# Patient Record
Sex: MALE | Race: WHITE | NOT HISPANIC OR LATINO
[De-identification: names, ages, dates, MRNs, and addresses within clinical notes are randomized per-mention and may not be internally consistent; named-entity substitution may affect disease eponyms.]

---

## 2019-11-15 ENCOUNTER — TRANSCRIPTION ENCOUNTER (OUTPATIENT)
Age: 69
End: 2019-11-15

## 2019-11-15 ENCOUNTER — APPOINTMENT (OUTPATIENT)
Dept: OTHER | Facility: CLINIC | Age: 69
End: 2019-11-15
Payer: COMMERCIAL

## 2019-11-15 VITALS
BODY MASS INDEX: 28.44 KG/M2 | HEART RATE: 88 BPM | WEIGHT: 210 LBS | RESPIRATION RATE: 16 BRPM | DIASTOLIC BLOOD PRESSURE: 85 MMHG | SYSTOLIC BLOOD PRESSURE: 130 MMHG | OXYGEN SATURATION: 98 % | HEIGHT: 72 IN

## 2019-11-15 PROCEDURE — 99396 PREV VISIT EST AGE 40-64: CPT | Mod: 25

## 2019-11-15 PROCEDURE — 94010 BREATHING CAPACITY TEST: CPT

## 2019-11-15 NOTE — DISCUSSION/SUMMARY
[Patient seen for WTC Monitoring ___] : Patient was seen for WTC monitoring [unfilled] [Please See Note in Chart and Documentation in Trial DB] : Please see note in chart and documentation in Trial DB. [FreeTextEntry3] : 70 YO retired [employer] employee\par On 09/11/01 assigned by employer ([employer]) to [address] [place] [address],which is about 6 blocks away from "[facility]".Worked at the site for about 3H,then on 09/12 had scheduled day off.\par On 09/13/01 - 12/2001 assigned to [employer] 1-2 blocks away from "[place]".\par Worked 12H/day x 7days/week for 4wks,then 10H/day x 6 days/week until 12/20/01.\par \par PMH: Diabetes, Cholecystectomy, elevated cholesterol, Glaucoma, \par  elevated PSA and BX of prostate, followed by Urologist \par also has BCC removed R outer eye, had MOHS SX- was not able to obtain pathology \par Smokes small bowl pipe daily for 49 years,\par \par \par ROS , MEDS, PE see trial DB \par Spirometry- Quality D, but NL \par \par CXR  had CT due to back pain 11 06 2019- NL lungs \par PE in trial DB \par A/P periodic WTC MV\par CBC, CMP, LIpids, CXR, UA ordered \par PT will forward me SKIN path report for review and decision re cert as WTC related \par \par REC in 1 year. \par

## 2019-11-15 NOTE — HEALTH RISK ASSESSMENT
[Patient reported colonoscopy was normal] : Patient reported colonoscopy was normal [ColonoscopyDate] : 2014 [ColonoscopyComments] : NL

## 2019-11-18 LAB
ALBUMIN SERPL ELPH-MCNC: 4.5 G/DL
ALP BLD-CCNC: 49 U/L
ALT SERPL-CCNC: 19 U/L
ANION GAP SERPL CALC-SCNC: 14 MMOL/L
APPEARANCE: CLEAR
AST SERPL-CCNC: 18 U/L
BACTERIA: NEGATIVE
BASOPHILS # BLD AUTO: 0.04 K/UL
BASOPHILS NFR BLD AUTO: 0.5 %
BILIRUB SERPL-MCNC: 0.2 MG/DL
BILIRUBIN URINE: NEGATIVE
BLOOD URINE: NEGATIVE
BUN SERPL-MCNC: 18 MG/DL
CALCIUM SERPL-MCNC: 9.3 MG/DL
CHLORIDE SERPL-SCNC: 107 MMOL/L
CHOLEST SERPL-MCNC: 134 MG/DL
CHOLEST/HDLC SERPL: 4.3 RATIO
CO2 SERPL-SCNC: 22 MMOL/L
COLOR: YELLOW
CREAT SERPL-MCNC: 1.17 MG/DL
EOSINOPHIL # BLD AUTO: 0.21 K/UL
EOSINOPHIL NFR BLD AUTO: 2.7 %
GLUCOSE QUALITATIVE U: ABNORMAL
GLUCOSE SERPL-MCNC: 114 MG/DL
HCT VFR BLD CALC: 47.8 %
HDLC SERPL-MCNC: 31 MG/DL
HGB BLD-MCNC: 15.3 G/DL
HYALINE CASTS: 1 /LPF
IMM GRANULOCYTES NFR BLD AUTO: 0.5 %
KETONES URINE: NEGATIVE
LDLC SERPL CALC-MCNC: 54 MG/DL
LEUKOCYTE ESTERASE URINE: NEGATIVE
LYMPHOCYTES # BLD AUTO: 1.45 K/UL
LYMPHOCYTES NFR BLD AUTO: 18.6 %
MAN DIFF?: NORMAL
MCHC RBC-ENTMCNC: 30.6 PG
MCHC RBC-ENTMCNC: 32 GM/DL
MCV RBC AUTO: 95.6 FL
MICROSCOPIC-UA: NORMAL
MONOCYTES # BLD AUTO: 0.54 K/UL
MONOCYTES NFR BLD AUTO: 6.9 %
NEUTROPHILS # BLD AUTO: 5.51 K/UL
NEUTROPHILS NFR BLD AUTO: 70.8 %
NITRITE URINE: NEGATIVE
PH URINE: 6.5
PLATELET # BLD AUTO: 273 K/UL
POTASSIUM SERPL-SCNC: 4.3 MMOL/L
PROT SERPL-MCNC: 6.4 G/DL
PROTEIN URINE: NEGATIVE
RBC # BLD: 5 M/UL
RBC # FLD: 13.7 %
RED BLOOD CELLS URINE: 1 /HPF
SODIUM SERPL-SCNC: 143 MMOL/L
SPECIFIC GRAVITY URINE: 1.03
SQUAMOUS EPITHELIAL CELLS: 1 /HPF
TRIGL SERPL-MCNC: 246 MG/DL
UROBILINOGEN URINE: NORMAL
WBC # FLD AUTO: 7.79 K/UL
WHITE BLOOD CELLS URINE: 0 /HPF

## 2020-06-26 ENCOUNTER — APPOINTMENT (OUTPATIENT)
Dept: UROLOGY | Facility: CLINIC | Age: 70
End: 2020-06-26
Payer: MEDICARE

## 2020-06-26 VITALS
DIASTOLIC BLOOD PRESSURE: 87 MMHG | TEMPERATURE: 98.4 F | RESPIRATION RATE: 16 BRPM | SYSTOLIC BLOOD PRESSURE: 135 MMHG | HEART RATE: 81 BPM

## 2020-06-26 DIAGNOSIS — K75.9 INFLAMMATORY LIVER DISEASE, UNSPECIFIED: ICD-10-CM

## 2020-06-26 DIAGNOSIS — M19.90 UNSPECIFIED OSTEOARTHRITIS, UNSPECIFIED SITE: ICD-10-CM

## 2020-06-26 PROCEDURE — 99213 OFFICE O/P EST LOW 20 MIN: CPT | Mod: 25

## 2020-06-26 PROCEDURE — 81003 URINALYSIS AUTO W/O SCOPE: CPT | Mod: QW

## 2020-06-26 RX ORDER — DAPAGLIFLOZIN 10 MG/1
TABLET, FILM COATED ORAL
Refills: 0 | Status: ACTIVE | COMMUNITY

## 2020-06-26 NOTE — ASSESSMENT
[FreeTextEntry1] : to continue observation\par await prior records to compare psa's and exam\par \par f/u 6 months\par

## 2020-06-26 NOTE — HISTORY OF PRESENT ILLNESS
[FreeTextEntry1] : 68 yo male with h/o high psa-\par last biopsy 2 years ago\par has had multiple biopsies including fusion\par has had asap and pin in the past as well as fusion biopsy previouslly\par Last saw 9 months ago-psa in high 4's\par \par voiding well\par here for free psa and exam\par

## 2020-06-26 NOTE — REVIEW OF SYSTEMS
[Wake up at night to urinate  How many times?  ___] : wakes up to urinate [unfilled] times during the night [Poor quality erections] : Poor quality erections [Joint Pain] : joint pain [Negative] : Heme/Lymph

## 2020-06-26 NOTE — LETTER BODY
[Dear  ___] : Dear  [unfilled], [Courtesy Letter:] : I had the pleasure of seeing your patient, [unfilled], in my office today. [Please see my note below.] : Please see my note below. [Consult Closing:] : Thank you very much for allowing me to participate in the care of this patient.  If you have any questions, please do not hesitate to contact me. [FreeTextEntry1] : doing well \par see note below [FreeTextEntry3] : Fred Stiles MD FACS\par \par Attending Urologist-[facility]\par Director - Strategic Operations Urology\par Assistant Clinical Professor-[facility]\par \par

## 2020-06-26 NOTE — PHYSICAL EXAM
[General Appearance - Well Developed] : well developed [General Appearance - Well Nourished] : well nourished [Normal Appearance] : normal appearance [Well Groomed] : well groomed [General Appearance - In No Acute Distress] : no acute distress [Edema] : no peripheral edema [Respiration, Rhythm And Depth] : normal respiratory rhythm and effort [Exaggerated Use Of Accessory Muscles For Inspiration] : no accessory muscle use [Abdomen Tenderness] : non-tender [Abdomen Soft] : soft [Costovertebral Angle Tenderness] : no ~M costovertebral angle tenderness [Size (2+)] : size was 2+ [Prostate Hard Area Or Nodule On The Left] : had a palpable nodule [Mid] : mid [Nl Inspection] : the anus was normal on inspection. [Nl Sphincter Tone] : normal sphincter tone [Nl Perineum] : perineum was normal on inspection [No Lesions] : no lesions [Circumcised] : the penis was circumcised [Normal] : normal [Testes] : normal [Epididymis] : was normal [Vas Deferens / Spermatic Cord] : was normal [Normal Station and Gait] : the gait and station were normal for the patient's age [] : no rash [No Focal Deficits] : no focal deficits [Oriented To Time, Place, And Person] : oriented to person, place, and time [Affect] : the affect was normal [Mood] : the mood was normal [Not Anxious] : not anxious [No Palpable Adenopathy] : no palpable adenopathy [Tenderness] : no tenderness

## 2020-06-28 LAB
PSA FREE FLD-MCNC: 17 %
PSA FREE SERPL-MCNC: 0.8 NG/ML
PSA SERPL-MCNC: 4.58 NG/ML

## 2020-07-09 ENCOUNTER — RESULT CHARGE (OUTPATIENT)
Age: 70
End: 2020-07-09

## 2020-12-05 ENCOUNTER — TRANSCRIPTION ENCOUNTER (OUTPATIENT)
Age: 70
End: 2020-12-05

## 2021-01-08 ENCOUNTER — APPOINTMENT (OUTPATIENT)
Dept: UROLOGY | Facility: CLINIC | Age: 71
End: 2021-01-08
Payer: MEDICARE

## 2021-01-08 VITALS
HEART RATE: 81 BPM | RESPIRATION RATE: 16 BRPM | TEMPERATURE: 97.5 F | SYSTOLIC BLOOD PRESSURE: 150 MMHG | DIASTOLIC BLOOD PRESSURE: 79 MMHG

## 2021-01-08 PROCEDURE — 99212 OFFICE O/P EST SF 10 MIN: CPT | Mod: 25

## 2021-01-08 PROCEDURE — 81003 URINALYSIS AUTO W/O SCOPE: CPT | Mod: QW

## 2021-01-08 NOTE — HISTORY OF PRESENT ILLNESS
[FreeTextEntry1] : 70 year old male with ASAP,  PIN\par Patient has palpable nodule on the left, has had multiple fusion biopsies in the past- most recent 2 years ago\par with asap, pin\par \par PSA 6/28: 4.58\par PSA has been in high 4s\par \par also nocturia 1x- not bothersome \par

## 2021-01-08 NOTE — END OF VISIT
[FreeTextEntry3] : Medical record entries made by the scribe today, were at my direction and personally dictated to them by me, Dr. Fred Stiles on 01/08/2021. I have reviewed the chart and agree that the record accurately reflects my personal performance of the history, physical exam, assessment, and plan.

## 2021-01-08 NOTE — ASSESSMENT
[FreeTextEntry1] : 70 year old male with ASAP, PIN\par Free PSA sent today\par Glucose seen in urine, patient was notified-due to Farx[facility]\par \par Follow up 6 months psa ipss

## 2021-01-08 NOTE — PHYSICAL EXAM
[General Appearance - Well Developed] : well developed [General Appearance - Well Nourished] : well nourished [Normal Appearance] : normal appearance [Well Groomed] : well groomed [General Appearance - In No Acute Distress] : no acute distress [Abdomen Soft] : soft [Abdomen Tenderness] : non-tender [Costovertebral Angle Tenderness] : no ~M costovertebral angle tenderness [Urethral Meatus] : meatus normal [Penis Abnormality] : normal circumcised penis [Scrotum] : the scrotum was normal [Rectal Exam - Seminal Vesicles] : the seminal vesicles were normal [Epididymis] : the epididymides were normal [Testes Tenderness] : no tenderness of the testes [Testes Mass (___cm)] : there were no testicular masses [Anus Abnormality] : the anus and perineum were normal [Rectal Exam - Rectum] : digital rectal exam was normal [Prostate Enlargement] : the prostate was not enlarged [Prostate Tenderness] : the prostate was not tender [Prostate Size ___ (0-4)] : prostate size [unfilled] (scale: 0-4) [FreeTextEntry1] : palpable nodule on the left - unchanged for years-multiple biopsies

## 2021-01-11 ENCOUNTER — TRANSCRIPTION ENCOUNTER (OUTPATIENT)
Age: 71
End: 2021-01-11

## 2021-01-11 LAB
PSA FREE FLD-MCNC: 19 %
PSA FREE SERPL-MCNC: 0.92 NG/ML
PSA SERPL-MCNC: 4.92 NG/ML

## 2021-02-17 ENCOUNTER — FORM ENCOUNTER (OUTPATIENT)
Age: 71
End: 2021-02-17

## 2021-02-18 ENCOUNTER — APPOINTMENT (OUTPATIENT)
Dept: OTHER | Facility: CLINIC | Age: 71
End: 2021-02-18
Payer: COMMERCIAL

## 2021-02-18 DIAGNOSIS — Z04.9 ENCOUNTER FOR EXAMINATION AND OBSERVATION FOR UNSPECIFIED REASON: ICD-10-CM

## 2021-02-18 PROCEDURE — 99397 PER PM REEVAL EST PAT 65+ YR: CPT | Mod: 95

## 2021-02-18 NOTE — DISCUSSION/SUMMARY
[Patient seen for WTC Monitoring ___] : Patient was seen for WTC monitoring [unfilled] [Please See Note in Chart and Documentation in Trial DB] : Please see note in chart and documentation in Trial DB. [Home] : at home, [unfilled] , at the time of the visit. [Other Location: e.g. Home (Enter Location, City,State)___] : at [unfilled] [Verbal consent obtained from patient] : the patient, [unfilled] [FreeTextEntry3] : 71 YO retired [employer] employee\par On 09/11/01 assigned by employer ([employer]) to [address] [place] [address],which is about 6 blocks away from "[place]".Worked at the site for about 3H,then on 09/12 had scheduled day off.\par On 09/13/01 - 12/2001 assigned to [employer] 1-2 blocks away from [place]\par Worked 12H/day x 7days/week for 4wks,then 10H/day x 6 days/week until 12/20/01.\par \par PMH: Diabetes, Cholecystectomy, elevated cholesterol, Glaucoma, \par  elevated PSA and BX of prostate, followed by Urologist \par also has BCC r cantus removed R outer eye, had MOHS SX- \par s/ MOHS 2016\par Smokes small bowl pipe daily for 49 years,\par \par \par \par \par CXR  had CT due to back pain 11 06 2019- NL lungs \par \par A/P periodic WTC MV\par \par PT will forward me SKIN path report for review \par  stated that had nodular BCC 2016 wtc 3 will be filled out \par had flu vaccine already\par  pneumonia vaccine as well\par  scheduled for COVID injection \par \par REC in 1 year. \par

## 2021-03-11 PROBLEM — K75.9 HEPATITIS: Status: ACTIVE | Noted: 2020-06-26

## 2021-03-30 ENCOUNTER — FORM ENCOUNTER (OUTPATIENT)
Age: 71
End: 2021-03-30

## 2021-04-09 ENCOUNTER — APPOINTMENT (OUTPATIENT)
Dept: OTHER | Facility: CLINIC | Age: 71
End: 2021-04-09
Payer: COMMERCIAL

## 2021-04-09 DIAGNOSIS — C44.91 BASAL CELL CARCINOMA OF SKIN, UNSPECIFIED: ICD-10-CM

## 2021-04-09 PROCEDURE — 99441: CPT | Mod: 95

## 2021-04-09 NOTE — REASON FOR VISIT
[Follow-Up] : a follow-up visit [FreeTextEntry1] : BCC SKIN CANCER Certified BY [facility] AS WTC RELATED

## 2021-04-09 NOTE — HISTORY OF PRESENT ILLNESS
[FreeTextEntry1] : 69 YO retired [employer] employee\par On 09/11/01 assigned by employer ([employer]) to [address] [place] [address],which is about 6 blocks away from [place]Worked at the site for about 3H,then on 09/12 had scheduled day off.\par On 09/13/01 - 12/2001 assigned to [employer] 1-2 blocks away from [place]\par Worked 12H/day x 7days/week for 4wks,then 10H/day x 6 days/week until 12/20/01.\par \par PMH: Diabetes, Cholecystectomy, elevated cholesterol, Glaucoma, \par  elevated PSA and BX of prostate, followed by Urologist \par \par also has BCC r cantus removed R outer eye, had MOHS SX- \par s/P  MOHS 2016\par HE IS FOLLOWED BY DERMATOLOGIST AND GETS REGULAR SKIN CHECKS \par

## 2021-04-09 NOTE — ASSESSMENT
[FreeTextEntry1] : Patient WITH HX OF SKIN CANCER CERTIFIED BY [facility] AS WTC RELATED \par  HE PLANS TO Continue TO FOLLOW UP With HIS DERMATOLOGIST FOR REGULAR SKIN CHECKS \par  I EXPLAINED TO HIM ABOUT [facility] CLAIM Filing PROCESS, HE SAID HE WAS NOT INTERESTED AT THIS TIME\par \par

## 2021-07-09 ENCOUNTER — APPOINTMENT (OUTPATIENT)
Dept: UROLOGY | Facility: CLINIC | Age: 71
End: 2021-07-09
Payer: MEDICARE

## 2021-07-09 ENCOUNTER — RESULT CHARGE (OUTPATIENT)
Age: 71
End: 2021-07-09

## 2021-07-09 VITALS
SYSTOLIC BLOOD PRESSURE: 146 MMHG | DIASTOLIC BLOOD PRESSURE: 88 MMHG | TEMPERATURE: 98.2 F | HEART RATE: 80 BPM | OXYGEN SATURATION: 96 %

## 2021-07-09 PROCEDURE — 99213 OFFICE O/P EST LOW 20 MIN: CPT

## 2021-07-10 RX ORDER — BLOOD SUGAR DIAGNOSTIC
STRIP MISCELLANEOUS
Qty: 100 | Refills: 0 | Status: DISCONTINUED | COMMUNITY
Start: 2021-01-12

## 2021-07-10 RX ORDER — BIMATOPROST 0.1 MG/ML
0.01 SOLUTION/ DROPS OPHTHALMIC
Qty: 5 | Refills: 0 | Status: DISCONTINUED | COMMUNITY
Start: 2020-12-24

## 2021-07-10 NOTE — HISTORY OF PRESENT ILLNESS
[FreeTextEntry1] : 70 year old male following up for ASAP, PIN -dx on biopsy 2015\par \par 2015 pin/asap-repeat 2015 asap-MDX 27% prob malig-8% high grade-mRI pirad 4\par \par 2017 asap-mdx 31 % prob with 11% high grade--MRI pirad 2--psa 7.6\par \par pt has palpable nodule on the left \par \par notes nocturia x 1 - not bothersome and is doing well\par \par not currently on any  meds \par \par IPSS 06/26/2020: 1 \par \par PSA 6/28/2020: 4.58 ng/mL \par PSA 01/07/2021: 4.92 ng/mL \par \par Has had multiple biopsy-

## 2021-07-10 NOTE — END OF VISIT
[FreeTextEntry3] : Medical record entries made by the scribe today, were at my direction and personally dictated to them by me, Dr. Fred Stiles on 07/09/2021. I have reviewed the chart and agree that the record accurately reflects my personal performance of the history, physical exam, assessment, and plan.

## 2021-07-10 NOTE — PHYSICAL EXAM
[General Appearance - Well Developed] : well developed [General Appearance - Well Nourished] : well nourished [Normal Appearance] : normal appearance [Well Groomed] : well groomed [General Appearance - In No Acute Distress] : no acute distress [Abdomen Soft] : soft [Abdomen Tenderness] : non-tender [Costovertebral Angle Tenderness] : no ~M costovertebral angle tenderness [Urethral Meatus] : meatus normal [Scrotum] : the scrotum was normal [Epididymis] : the epididymides were normal [Testes Tenderness] : no tenderness of the testes [Testes Mass (___cm)] : there were no testicular masses [Anus Abnormality] : the anus and perineum were normal [Rectal Exam - Rectum] : digital rectal exam was normal [Prostate Enlargement] : the prostate was not enlarged [Prostate Tenderness] : the prostate was not tender [Penis Abnormality] : normal circumcised penis [Prostate Size ___ (0-4)] : prostate size [unfilled] (scale: 0-4) [FreeTextEntry1] : nodule left side

## 2021-07-10 NOTE — ASSESSMENT
[FreeTextEntry1] : 70 year old male with ASAP, PIN \par pt has palpable nodule on the left -s/p multiple biopsy and mri\par \par free PSA sent today\par follow up in 6 months forfree  PSA and IPSS

## 2021-07-23 LAB
PSA FREE FLD-MCNC: 15 %
PSA FREE SERPL-MCNC: 0.73 NG/ML
PSA SERPL-MCNC: 4.75 NG/ML

## 2022-01-07 ENCOUNTER — RESULT CHARGE (OUTPATIENT)
Age: 72
End: 2022-01-07

## 2022-01-07 ENCOUNTER — APPOINTMENT (OUTPATIENT)
Dept: UROLOGY | Facility: CLINIC | Age: 72
End: 2022-01-07
Payer: MEDICARE

## 2022-01-07 VITALS
HEART RATE: 89 BPM | HEIGHT: 72 IN | SYSTOLIC BLOOD PRESSURE: 142 MMHG | BODY MASS INDEX: 29.8 KG/M2 | TEMPERATURE: 98 F | OXYGEN SATURATION: 94 % | DIASTOLIC BLOOD PRESSURE: 77 MMHG | WEIGHT: 220 LBS

## 2022-01-07 PROCEDURE — 99213 OFFICE O/P EST LOW 20 MIN: CPT

## 2022-01-07 PROCEDURE — 81003 URINALYSIS AUTO W/O SCOPE: CPT | Mod: QW

## 2022-01-13 NOTE — ASSESSMENT
[FreeTextEntry1] : 71 year old male with ASAP, PIN\par \par  prostate nodule feels slightly larger and harder\par Free PSA sent today\par has had multiple biopsies \par \par to go for MR prostate\par \par Follow up in 6 months pending above

## 2022-01-13 NOTE — PHYSICAL EXAM
[General Appearance - Well Developed] : well developed [General Appearance - Well Nourished] : well nourished [Normal Appearance] : normal appearance [Well Groomed] : well groomed [General Appearance - In No Acute Distress] : no acute distress [Abdomen Soft] : soft [Abdomen Tenderness] : non-tender [Costovertebral Angle Tenderness] : no ~M costovertebral angle tenderness [Urethral Meatus] : meatus normal [Penis Abnormality] : normal circumcised penis [Scrotum] : the scrotum was normal [Rectal Exam - Seminal Vesicles] : the seminal vesicles were normal [Epididymis] : the epididymides were normal [Testes Tenderness] : no tenderness of the testes [Testes Mass (___cm)] : there were no testicular masses [Anus Abnormality] : the anus and perineum were normal [Rectal Exam - Rectum] : digital rectal exam was normal [Prostate Enlargement] : the prostate was not enlarged [Prostate Tenderness] : the prostate was not tender [Prostate Size ___ (0-4)] : prostate size [unfilled] (scale: 0-4) [FreeTextEntry1] : significant nodule on the left- feels slightly larger

## 2022-01-13 NOTE — HISTORY OF PRESENT ILLNESS
[FreeTextEntry1] : 71 year old male hx of ASAP, PIN here today for follow up\par \par 2015 pin/ASAP-repeat 2015 asap-MDX 27% prob malig-8% high grade-mRI pirad 4\par \par 2017 ASAP-mdx 31 % prob with 11% high grade--MRI pirad 2--psa 7.6\par \par Patient has palpable nodule on the left\par \par PSA 7/23/21: 4.75, 0.73, 15% free\par PSA 1/07/2021: 4.92  \par PSA 6/28/2020: 4.58 \par \par multiple biopsies in the past\par \par doing well with no complaints \par IPSS: 2

## 2022-01-13 NOTE — END OF VISIT
[FreeTextEntry3] : Medical record entries made by the scribe today, were at my direction and personally dictated to them by me, Dr. Fred Stiles on 01/07/2022. I have reviewed the chart and agree that the record accurately reflects my personal performance of the history, physical exam, assessment, and plan.

## 2022-01-14 ENCOUNTER — NON-APPOINTMENT (OUTPATIENT)
Age: 72
End: 2022-01-14

## 2022-01-14 LAB
PSA FREE FLD-MCNC: 23 %
PSA FREE SERPL-MCNC: 1.7 NG/ML
PSA SERPL-MCNC: 7.48 NG/ML

## 2022-01-16 ENCOUNTER — OUTPATIENT (OUTPATIENT)
Dept: OUTPATIENT SERVICES | Facility: HOSPITAL | Age: 72
LOS: 1 days | End: 2022-01-16
Payer: MEDICARE

## 2022-01-16 ENCOUNTER — RESULT REVIEW (OUTPATIENT)
Age: 72
End: 2022-01-16

## 2022-01-16 ENCOUNTER — APPOINTMENT (OUTPATIENT)
Dept: MRI IMAGING | Facility: IMAGING CENTER | Age: 72
End: 2022-01-16
Payer: MEDICARE

## 2022-01-16 DIAGNOSIS — N40.2 NODULAR PROSTATE WITHOUT LOWER URINARY TRACT SYMPTOMS: ICD-10-CM

## 2022-01-16 DIAGNOSIS — N42.32 ATYPICAL SMALL ACINAR PROLIFERATION OF PROSTATE: ICD-10-CM

## 2022-01-16 PROCEDURE — 72197 MRI PELVIS W/O & W/DYE: CPT | Mod: 26,ME

## 2022-01-16 PROCEDURE — G1004: CPT

## 2022-01-16 PROCEDURE — 76498P: CUSTOM | Mod: 26,MH

## 2022-01-16 PROCEDURE — A9585: CPT

## 2022-01-16 PROCEDURE — 76498 UNLISTED MR PROCEDURE: CPT

## 2022-01-16 PROCEDURE — 72197 MRI PELVIS W/O & W/DYE: CPT | Mod: ME

## 2022-05-09 ENCOUNTER — NON-APPOINTMENT (OUTPATIENT)
Age: 72
End: 2022-05-09

## 2022-07-08 ENCOUNTER — APPOINTMENT (OUTPATIENT)
Dept: UROLOGY | Facility: CLINIC | Age: 72
End: 2022-07-08

## 2022-07-08 VITALS
HEART RATE: 66 BPM | DIASTOLIC BLOOD PRESSURE: 69 MMHG | TEMPERATURE: 97.6 F | BODY MASS INDEX: 29.26 KG/M2 | WEIGHT: 216 LBS | HEIGHT: 72 IN | SYSTOLIC BLOOD PRESSURE: 125 MMHG | OXYGEN SATURATION: 96 %

## 2022-07-08 PROCEDURE — 81003 URINALYSIS AUTO W/O SCOPE: CPT | Mod: QW

## 2022-07-08 PROCEDURE — 99213 OFFICE O/P EST LOW 20 MIN: CPT

## 2022-07-08 RX ORDER — TRAVOPROST (BENZALKONIUM) 0.004 %
0 DROPS OPHTHALMIC (EYE)
Refills: 0 | Status: DISCONTINUED | COMMUNITY
End: 2022-07-08

## 2022-07-09 RX ORDER — LOSARTAN POTASSIUM 100 MG/1
100 TABLET, FILM COATED ORAL
Qty: 90 | Refills: 0 | Status: ACTIVE | COMMUNITY
Start: 2022-05-25

## 2022-07-09 RX ORDER — LATANOPROST/PF 0.005 %
0.01 DROPS OPHTHALMIC (EYE)
Qty: 2 | Refills: 0 | Status: ACTIVE | COMMUNITY
Start: 2022-06-22

## 2022-07-09 RX ORDER — BIMATOPROST 0.1 MG/ML
0.01 SOLUTION/ DROPS OPHTHALMIC
Refills: 0 | Status: ACTIVE | COMMUNITY

## 2022-07-09 RX ORDER — FENOFIBRATE 134 MG/1
134 CAPSULE ORAL
Qty: 90 | Refills: 0 | Status: ACTIVE | COMMUNITY
Start: 2022-05-25

## 2022-07-09 RX ORDER — METFORMIN ER 500 MG 500 MG/1
500 TABLET ORAL
Qty: 360 | Refills: 0 | Status: ACTIVE | COMMUNITY
Start: 2022-04-23

## 2022-07-09 NOTE — END OF VISIT
[FreeTextEntry3] : Medical record entries made by the scribe today, were at my direction and personally dictated to them by me, Dr. Fred Stiles on 07/08/2022. I have reviewed the chart and agree that the record accurately reflects my personal performance of the history, physical exam, assessment, and plan.

## 2022-07-09 NOTE — HISTORY OF PRESENT ILLNESS
[FreeTextEntry1] : 71 year old male with h/o ASAP, PIN, left prostate nodule here today for follow up \par \par MR prostate 1/16/22: PIRADS 2, volume-70 cc, PSAD: 0.07\par \par 2015 pin/ASAP-repeat 2015 asap-MDX 27% prob malig-8% high grade-mRI pirad 4\par \par 2017 ASAP-mdx 31 % prob with 11% high grade--MRI pirad 2--psa 7.6\par \par Patient has palpable nodule on the left\par \par PSA total and free 1/6/2022: 7.48, 1.70, free 23% \par PSA 7/23/21: 4.75, 0.73, 15% free\par PSA 1/07/2021: 4.92 \par PSA 6/28/2020: 4.58 \par \par multiple biopsies in the past\par \par no issues with urination \par IPSS today: 3

## 2022-07-09 NOTE — PHYSICAL EXAM
[General Appearance - Well Developed] : well developed [General Appearance - Well Nourished] : well nourished [Normal Appearance] : normal appearance [Well Groomed] : well groomed [General Appearance - In No Acute Distress] : no acute distress [Abdomen Soft] : soft [Abdomen Tenderness] : non-tender [Costovertebral Angle Tenderness] : no ~M costovertebral angle tenderness [Urethral Meatus] : meatus normal [Penis Abnormality] : normal circumcised penis [Scrotum] : the scrotum was normal [Urinary Bladder Findings] : the bladder was normal on palpation [Epididymis] : the epididymides were normal [Testes Tenderness] : no tenderness of the testes [Testes Mass (___cm)] : there were no testicular masses [Anus Abnormality] : the anus and perineum were normal [Rectal Exam - Rectum] : digital rectal exam was normal [Prostate Size ___ (0-4)] : prostate size [unfilled] (scale: 0-4) [FreeTextEntry1] : left nodulewithoutchange

## 2022-07-09 NOTE — ASSESSMENT
[FreeTextEntry1] : 71 year old male with h/o ASAP, PIN, prostate nodule \par \par PSA total and free sent today \par \par await PSA\par \par ?rebiopsy \par \par f/u 6 months pending PSA

## 2022-07-24 LAB
PSA FREE FLD-MCNC: 17 %
PSA FREE SERPL-MCNC: 0.87 NG/ML
PSA SERPL-MCNC: 5.11 NG/ML

## 2023-01-17 ENCOUNTER — APPOINTMENT (OUTPATIENT)
Dept: UROLOGY | Facility: CLINIC | Age: 73
End: 2023-01-17
Payer: MEDICARE

## 2023-01-17 VITALS
DIASTOLIC BLOOD PRESSURE: 83 MMHG | TEMPERATURE: 98.1 F | HEART RATE: 78 BPM | BODY MASS INDEX: 29.16 KG/M2 | SYSTOLIC BLOOD PRESSURE: 151 MMHG | WEIGHT: 215 LBS | OXYGEN SATURATION: 97 %

## 2023-01-17 DIAGNOSIS — N42.31 PROSTATIC INTRAEPITHELIAL NEOPLASIA: ICD-10-CM

## 2023-01-17 PROCEDURE — 99213 OFFICE O/P EST LOW 20 MIN: CPT

## 2023-01-18 PROBLEM — N42.31: Status: ACTIVE | Noted: 2020-06-26

## 2023-01-18 LAB
PSA FREE FLD-MCNC: 20 %
PSA FREE SERPL-MCNC: 0.91 NG/ML
PSA SERPL-MCNC: 4.49 NG/ML

## 2023-01-18 RX ORDER — AMOXICILLIN AND CLAVULANATE POTASSIUM 875; 125 MG/1; MG/1
875-125 TABLET, COATED ORAL
Qty: 20 | Refills: 0 | Status: COMPLETED | COMMUNITY
Start: 2022-11-09

## 2023-01-18 RX ORDER — FLUTICASONE PROPIONATE 50 UG/1
50 SPRAY, METERED NASAL
Qty: 16 | Refills: 0 | Status: COMPLETED | COMMUNITY
Start: 2022-11-09

## 2023-01-18 NOTE — END OF VISIT
[FreeTextEntry3] : Medical record entries made by the scribe today, were at my direction and personally dictated to them by me, Dr. Fred Stiles on 01/17/2023. I have reviewed the chart and agree that the record accurately reflects my personal performance of the history, physical exam, assessment, and plan.\par

## 2023-01-18 NOTE — ASSESSMENT
[FreeTextEntry1] : 71 year old male with h/o ASAP, PIN, with persoistent  prostate nodule \par \par exosome and PSA total and free sent today \par \par pending exosome and PSA,\par \par \par rebiopsy or f/u in 6 months

## 2023-01-18 NOTE — ADDENDUM
[FreeTextEntry1] : I, Reginald Watson, solely acted as scribe for Dr. Fred Stiles on 01/17/2023.\par

## 2023-01-18 NOTE — HISTORY OF PRESENT ILLNESS
[FreeTextEntry1] : 71 year old male with h/o ASAP, PIN, left prostate nodule here today for follow up \par \par MR prostate 1/16/22: PIRADS 2, volume-70 cc, PSAD: 0.07\par \par 2015 pin/ASAP-\par \par repeat bx 2015 asap- at that time MDX 27% -mRI pirad 4\par \par \par 2017  biopsy ASAP- at that time mdx 31 % prob with 11% high grade--MRI pirad 2--psa 7.6\par \par Patient has unchanged palpable nodule on the left\par \par PSA total and free 1/6/2022: 7.48, 1.70, free 23% \par PSA 7/23/21: 4.75, 0.73, 15% free\par PSA 1/07/2021: 4.92 \par PSA 6/28/2020: 4.58 \par \par multiple biopsies in the past\par \par no issues with urination \par IPSS today: 3

## 2023-01-18 NOTE — PHYSICAL EXAM
[General Appearance - Well Developed] : well developed [General Appearance - Well Nourished] : well nourished [Normal Appearance] : normal appearance [Well Groomed] : well groomed [General Appearance - In No Acute Distress] : no acute distress [Abdomen Soft] : soft [Abdomen Tenderness] : non-tender [Costovertebral Angle Tenderness] : no ~M costovertebral angle tenderness [Urethral Meatus] : meatus normal [Scrotum] : the scrotum was normal [Epididymis] : the epididymides were normal [Testes Tenderness] : no tenderness of the testes [Testes Mass (___cm)] : there were no testicular masses [Anus Abnormality] : the anus and perineum were normal [Rectal Exam - Rectum] : digital rectal exam was normal [Prostate Tenderness] : the prostate was not tender [Prostate Size ___ (0-4)] : prostate size [unfilled] (scale: 0-4) [FreeTextEntry1] : nodule on left without change

## 2023-01-19 ENCOUNTER — NON-APPOINTMENT (OUTPATIENT)
Age: 73
End: 2023-01-19

## 2023-06-23 ENCOUNTER — APPOINTMENT (OUTPATIENT)
Dept: UROLOGY | Facility: CLINIC | Age: 73
End: 2023-06-23
Payer: MEDICARE

## 2023-06-23 VITALS
OXYGEN SATURATION: 96 % | SYSTOLIC BLOOD PRESSURE: 156 MMHG | DIASTOLIC BLOOD PRESSURE: 75 MMHG | TEMPERATURE: 97.8 F | HEART RATE: 83 BPM

## 2023-06-23 DIAGNOSIS — N42.32 ATYPICAL SMALL ACINAR PROLIFERATION OF PROSTATE: ICD-10-CM

## 2023-06-23 PROCEDURE — 99214 OFFICE O/P EST MOD 30 MIN: CPT

## 2023-06-23 RX ORDER — LOSARTAN POTASSIUM 50 MG/1
50 TABLET, FILM COATED ORAL
Refills: 0 | Status: DISCONTINUED | COMMUNITY
End: 2023-06-23

## 2023-06-23 NOTE — ASSESSMENT
[FreeTextEntry1] : 71 y/o male w h/o ASAP, PIN, and L prostate nodule\par \par PSA w free sent today\par \par d/w pt +/- predictive value of exosome test\par \par patient has stable palpable nodule on the left\par \par has no voiding complaints\par \par f/u w Dr Kaur in 6 months for PSA

## 2023-06-23 NOTE — PHYSICAL EXAM
[General Appearance - Well Developed] : well developed [General Appearance - Well Nourished] : well nourished [Normal Appearance] : normal appearance [Well Groomed] : well groomed [General Appearance - In No Acute Distress] : no acute distress [Abdomen Soft] : soft [Abdomen Tenderness] : non-tender [Costovertebral Angle Tenderness] : no ~M costovertebral angle tenderness [Urethral Meatus] : meatus normal [Penis Abnormality] : normal circumcised penis [Scrotum] : the scrotum was normal [Epididymis] : the epididymides were normal [Testes Tenderness] : no tenderness of the testes [Testes Mass (___cm)] : there were no testicular masses [Anus Abnormality] : the anus and perineum were normal [Rectal Exam - Rectum] : digital rectal exam was normal [Prostate Tenderness] : the prostate was not tender [Prostate Size ___ (0-4)] : prostate size [unfilled] (scale: 0-4) [FreeTextEntry1] : L stable palpable prostate nodule, patient was offered presence of chaperone for exam and declined

## 2023-06-23 NOTE — HISTORY OF PRESENT ILLNESS
[FreeTextEntry1] : 71 y/o male w h/o ASAP, PIN, L prostate nodule here today for follow up\par \par reviewed exosome test w pt 1/17/2023,\par score 28.77\par \par MRI prostate 1/16/2022,\par PSA density 0.07 ng/mL/mL\par prostate volume 70 cc\par PIRADS 2\par \par 2015 pin/ASAP-\par \par repeat bx 2015 asap- at that time MDX 27% -MRI pirad 4\par \par 2017 biopsy ASAP- at that time mdx 31 % prob with 11% high grade--MRI pirad 2--psa 7.6\par \par PSA total and free: 1/16/2023, 4.49, 0.91, 20% free\par 7/8/2022, 5.11, 0.87, 17% free\par 1/6/2022, 7.48, 1.70, 23% free\par 7/23/2021, 4.75, 0.73, 15% free\par 1/07/2021, 4.92, 0.92, 19% free\par 6/28/2020, 4.58, 0.80, 17% free\par \par multiple biopsies in past\par \par patient has had stable palpable nodule on the left\par \par has no urinary complaints,\par denies hematuria and dysuria\par \par IPSS today: 4\par 1/17/2023, 3\par 7/8/2022, 3\par 1/7/2022, 2\par 6/26/2020, 1 [facility]

## 2023-06-23 NOTE — END OF VISIT
[FreeTextEntry3] : All medical record entries made by the scribe today, were at my direction and personally dictated to them by me, Dr. Fred Stiles on 06/23/2023. I have reviewed the chart and agree that the record accurately reflects my personal performance of the history, physical exam, assessment, and plan. I have also personally directed, reviewed, and agreed with the chart.

## 2023-06-30 ENCOUNTER — NON-APPOINTMENT (OUTPATIENT)
Age: 73
End: 2023-06-30

## 2023-06-30 LAB
PSA FREE FLD-MCNC: 21 %
PSA FREE SERPL-MCNC: 1.05 NG/ML
PSA SERPL-MCNC: 4.96 NG/ML

## 2023-08-03 ENCOUNTER — NON-APPOINTMENT (OUTPATIENT)
Age: 73
End: 2023-08-03

## 2023-12-22 ENCOUNTER — APPOINTMENT (OUTPATIENT)
Dept: UROLOGY | Facility: CLINIC | Age: 73
End: 2023-12-22

## 2023-12-29 ENCOUNTER — NON-APPOINTMENT (OUTPATIENT)
Age: 73
End: 2023-12-29

## 2024-04-11 DIAGNOSIS — Z00.00 ENCOUNTER FOR GENERAL ADULT MEDICAL EXAMINATION W/OUT ABNORMAL FINDINGS: ICD-10-CM

## 2024-04-11 DIAGNOSIS — R97.20 ELEVATED PROSTATE, SPECIFIC ANTIGEN [PSA]: ICD-10-CM

## 2024-04-12 ENCOUNTER — APPOINTMENT (OUTPATIENT)
Dept: UROLOGY | Facility: CLINIC | Age: 74
End: 2024-04-12
Payer: MEDICARE

## 2024-04-12 VITALS
RESPIRATION RATE: 16 BRPM | BODY MASS INDEX: 29.12 KG/M2 | HEART RATE: 70 BPM | TEMPERATURE: 97.8 F | DIASTOLIC BLOOD PRESSURE: 71 MMHG | WEIGHT: 215 LBS | SYSTOLIC BLOOD PRESSURE: 137 MMHG | OXYGEN SATURATION: 98 % | HEIGHT: 72 IN

## 2024-04-12 DIAGNOSIS — N40.2 NODULAR PROSTATE W/OUT LOWER URINARY TRACT SYMPTOMS: ICD-10-CM

## 2024-04-12 PROCEDURE — 99203 OFFICE O/P NEW LOW 30 MIN: CPT

## 2024-04-12 NOTE — ASSESSMENT
[FreeTextEntry1] : 74 y/o male here today for check up Denies eating spicy, chocolate, citrus Numerous neg prostate Bx stated by patient   Denies FHx PCA PSA: 4.96 (06/30/2023)  MR Prostate (05/08/2022) INTERPRETATION:   No MRI targetable prostate lesions. 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 *PIRADS 2 - Low (clinically significant cancer is unlikely to be present) Moderate nodular hyperplasia of the transition zone for an overall gland volume of 70 cc. PSA density: 0.07 ng/mL/cc  Ordering MR Prostate  PSA/Renal drawn today A voided urine specimen was obtained and sent for ua/ucx  Will F/U in case of positive results

## 2024-04-12 NOTE — HISTORY OF PRESENT ILLNESS
[FreeTextEntry1] : 72 y/o male here today for check up Denies eating spicy, chocolate, citrus Numerous neg prostate Bx stated by patient   Denies FHx PCA PSA: 4.96 (06/30/2023)  MR Prostate (05/08/2022) INTERPRETATION:   No MRI targetable prostate lesions. 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 *PIRADS 2 - Low (clinically significant cancer is unlikely to be present) Moderate nodular hyperplasia of the transition zone for an overall gland volume of 70 cc. PSA density: 0.07 ng/mL/cc

## 2024-04-13 LAB
ALBUMIN SERPL ELPH-MCNC: 4.4 G/DL
ANION GAP SERPL CALC-SCNC: 16 MMOL/L
APPEARANCE: CLEAR
BACTERIA: NEGATIVE /HPF
BILIRUBIN URINE: NEGATIVE
BLOOD URINE: NEGATIVE
BUN SERPL-MCNC: 21 MG/DL
CALCIUM SERPL-MCNC: 9.5 MG/DL
CAST: 1 /LPF
CHLORIDE SERPL-SCNC: 107 MMOL/L
CO2 SERPL-SCNC: 20 MMOL/L
COLOR: YELLOW
CREAT SERPL-MCNC: 1.21 MG/DL
EGFR: 62 ML/MIN/1.73M2
EPITHELIAL CELLS: 0 /HPF
GLUCOSE QUALITATIVE U: >=1000 MG/DL
GLUCOSE SERPL-MCNC: 94 MG/DL
KETONES URINE: NEGATIVE MG/DL
LEUKOCYTE ESTERASE URINE: NEGATIVE
MICROSCOPIC-UA: NORMAL
NITRITE URINE: NEGATIVE
PH URINE: 5.5
PHOSPHATE SERPL-MCNC: 3.6 MG/DL
POTASSIUM SERPL-SCNC: 4.7 MMOL/L
PROTEIN URINE: NEGATIVE MG/DL
PSA SERPL-MCNC: 4.56 NG/ML
RED BLOOD CELLS URINE: 1 /HPF
SODIUM SERPL-SCNC: 143 MMOL/L
SPECIFIC GRAVITY URINE: >1.03
UROBILINOGEN URINE: 0.2 MG/DL
WHITE BLOOD CELLS URINE: 0 /HPF

## 2024-04-14 LAB — BACTERIA UR CULT: NORMAL

## 2024-04-25 ENCOUNTER — APPOINTMENT (OUTPATIENT)
Dept: MRI IMAGING | Facility: IMAGING CENTER | Age: 74
End: 2024-04-25
Payer: MEDICARE

## 2024-04-25 ENCOUNTER — RESULT REVIEW (OUTPATIENT)
Age: 74
End: 2024-04-25

## 2024-04-25 ENCOUNTER — OUTPATIENT (OUTPATIENT)
Dept: OUTPATIENT SERVICES | Facility: HOSPITAL | Age: 74
LOS: 1 days | End: 2024-04-25
Payer: MEDICARE

## 2024-04-25 DIAGNOSIS — Z00.8 ENCOUNTER FOR OTHER GENERAL EXAMINATION: ICD-10-CM

## 2024-04-25 DIAGNOSIS — R97.20 ELEVATED PROSTATE SPECIFIC ANTIGEN [PSA]: ICD-10-CM

## 2024-04-25 PROCEDURE — A9585: CPT

## 2024-04-25 PROCEDURE — 76498 UNLISTED MR PROCEDURE: CPT

## 2024-04-25 PROCEDURE — 72197 MRI PELVIS W/O & W/DYE: CPT

## 2024-04-25 PROCEDURE — 76498P: CUSTOM | Mod: 26

## 2024-04-25 PROCEDURE — 72197 MRI PELVIS W/O & W/DYE: CPT | Mod: 26

## 2024-10-30 ENCOUNTER — NON-APPOINTMENT (OUTPATIENT)
Age: 74
End: 2024-10-30

## 2024-10-30 ENCOUNTER — APPOINTMENT (OUTPATIENT)
Age: 74
End: 2024-10-30
Payer: MEDICARE

## 2024-10-30 PROCEDURE — 92004 COMPRE OPH EXAM NEW PT 1/>: CPT

## 2024-10-30 PROCEDURE — 92014 COMPRE OPH EXAM EST PT 1/>: CPT

## 2025-04-30 ENCOUNTER — APPOINTMENT (OUTPATIENT)
Age: 75
End: 2025-04-30
Payer: MEDICARE

## 2025-04-30 ENCOUNTER — NON-APPOINTMENT (OUTPATIENT)
Age: 75
End: 2025-04-30

## 2025-04-30 PROCEDURE — 92014 COMPRE OPH EXAM EST PT 1/>: CPT

## 2025-04-30 PROCEDURE — 92133 CPTRZD OPH DX IMG PST SGM ON: CPT
